# Patient Record
(demographics unavailable — no encounter records)

---

## 2025-02-03 NOTE — ASSESSMENT
[FreeTextEntry1] : 22-year-old male with first episode of nephrolithiasis found to have a 5 mm obstructing left ureteral calculus.  He is currently pain-free for the past 5 days.  He presents today for evaluation  Plan: 1.  Renal ultrasound and KUB 2.  Continue to strain urine 3.  Continue increased fluid hydration with increased fruit and vegetable intake 4.   office follow-up in 3 weeks to review above imaging study

## 2025-02-03 NOTE — HISTORY OF PRESENT ILLNESS
[FreeTextEntry1] : The patient is a 22-year-old male presenting with complaints of ureteral stones. He reports severe pain localized to the left side of the abdomen and suprapubic area, which had been present for approximately 5-6 days and started about 2 weeks ago. The pain worsened recently but has subsided over the past 5 days. His medical history includes risk factors for nephrolithiasis, notably family history and a low intake of fruits and vegetables. He denies high sodium and meat intake, as well as excessive dairy consumption. The stone was located in the left proximal ureter and is measured at 5mm in size on imaging 1/17/2025. The patient describes the pain as severe and notes that lying flat exacerbates it, while activities such as bending, twisting, walking, lying on the affected side, and standing do not worsen the pain. He reports partial relief from NSAIDs, pain medications, and rest, but no improvement with heat or ice. Associated symptoms include hematuria, urinary urgency, nausea, and vomiting. The patient denies frequent urination, fever, and chills during this episode. Additionally, he mentions that family members (sisters) have a history of urinary tract stones. A CT scan of the abdomen and pelvis has been performed as part of the evaluation for this episode of kidney stones.